# Patient Record
Sex: MALE | Race: WHITE | Employment: FULL TIME | URBAN - NONMETROPOLITAN AREA
[De-identification: names, ages, dates, MRNs, and addresses within clinical notes are randomized per-mention and may not be internally consistent; named-entity substitution may affect disease eponyms.]

---

## 2020-10-08 ENCOUNTER — APPOINTMENT (OUTPATIENT)
Dept: GENERAL RADIOLOGY | Age: 20
End: 2020-10-08
Payer: COMMERCIAL

## 2020-10-08 ENCOUNTER — HOSPITAL ENCOUNTER (EMERGENCY)
Age: 20
Discharge: HOME | End: 2020-10-08
Payer: COMMERCIAL

## 2020-10-08 VITALS
OXYGEN SATURATION: 99 % | HEART RATE: 98 BPM | RESPIRATION RATE: 18 BRPM | SYSTOLIC BLOOD PRESSURE: 132 MMHG | DIASTOLIC BLOOD PRESSURE: 77 MMHG

## 2020-10-08 PROCEDURE — 99282 EMERGENCY DEPT VISIT SF MDM: CPT

## 2020-10-08 PROCEDURE — 73562 X-RAY EXAM OF KNEE 3: CPT

## 2020-10-08 RX ORDER — NAPROXEN 500 MG/1
500 TABLET ORAL 2 TIMES DAILY
Qty: 60 TABLET | Refills: 0 | Status: SHIPPED | OUTPATIENT
Start: 2020-10-08

## 2020-10-08 ASSESSMENT — PAIN DESCRIPTION - ORIENTATION: ORIENTATION: RIGHT

## 2020-10-08 ASSESSMENT — PAIN DESCRIPTION - LOCATION: LOCATION: KNEE

## 2020-10-08 ASSESSMENT — ENCOUNTER SYMPTOMS
SHORTNESS OF BREATH: 0
SORE THROAT: 0

## 2020-10-08 ASSESSMENT — PAIN SCALES - GENERAL: PAINLEVEL_OUTOF10: 7

## 2020-10-08 NOTE — DISCHARGE INSTR - COC
Continuity of Care Form    Patient Name: Kalina Beltran   :  2000  MRN:  [id number]    Admit date:  10/8/2020  Discharge date:  ***    Code Status Order: No Order   Advance Directives:     Admitting Physician:  No admitting provider for patient encounter. PCP: No primary care provider on file. Discharging Nurse: [facility] Unit/Room#: C/C  Discharging Unit Phone Number: ***    Emergency Contact:   Extended Emergency Contact Information  Primary Emergency Contact: Krupa Camacho  Home Phone: [phone]  Mobile Phone: [phone]  Relation: Parent  Preferred language: English   needed? No    Past Surgical History:  No past surgical history on file. Immunization History: There is no immunization history on file for this patient. Active Problems: There is no problem list on file for this patient.       Isolation/Infection:   Isolation          No Isolation        Patient Infection Status     None to display          Nurse Assessment:  Last Vital Signs: /77   Pulse 98   Resp 18   SpO2 99%     Last documented pain score (0-10 scale): Pain Level: 7  Last Weight:   Wt Readings from Last 1 Encounters:   No data found for Wt     Mental Status:  {IP PT MENTAL STATUS:}    IV Access:  { RENETTA IV ACCESS:295686093}    Nursing Mobility/ADLs:  Walking   {P DME QUZH:602011531}  Transfer  {P DME NUVU:946008490}  Bathing  {CHP DME VDAS:862140121}  Dressing  {CHP DME VAXX:552233861}  Toileting  {P DME ZLVT:596041283}  Feeding  {[facility] DME RGAN:217027835}  Med Admin  {P DME PSRC:323964246}  Med Delivery   { RENETTA MED Delivery:673954612}    Wound Care Documentation and Therapy:        Elimination:  Continence:   · Bowel: {YES / RZ:93961}  · Bladder: {YES / SQ:51640}  Urinary Catheter: {Urinary Catheter:303715584}   Colostomy/Ileostomy/Ileal Conduit: {YES / ST:15606}       Date of Last BM: ***  No intake or output data in the 24 hours ending 10/08/20 1502  No intake/output data recorded.     Safety Concerns:     [address] Safety Concerns:982056168}    Impairments/Disabilities:      [address] [facility] Impairments/Disabilities:876667243}    Nutrition Therapy:  Current Nutrition Therapy:   [address] [facility] Diet List:880335186}    Routes of Feeding: {CHP DME Other Feedings:010213186}  Liquids: {Slp liquid thickness:52068}  Daily Fluid Restriction: {CHP DME Yes amt example:031235275}  Last Modified Barium Swallow with Video (Video Swallowing Test): {Done Not Done LGHC:711297821}    Treatments at the Time of Hospital Discharge:   Respiratory Treatments: ***  Oxygen Therapy:  {Therapy; copd oxygen:61161}  Ventilator:    { CC Vent JYLI:445692599}    Rehab Therapies: {THERAPEUTIC INTERVENTION:9378984920}  Weight Bearing Status/Restrictions: [address]  Weight Bearin}  Other Medical Equipment (for information only, NOT a DME order):  {EQUIPMENT:189468049}  Other Treatments: ***    Patient's personal belongings (please select all that are sent with patient):  {CHP DME Belongings:776412895}    RN SIGNATURE:  {Esignature:361007492}    CASE MANAGEMENT/SOCIAL WORK SECTION    Inpatient Status Date: ***    Readmission Risk Assessment Score:  Readmission Risk              Risk of Unplanned Readmission:        0           Discharging to Facility/ Agency   · Name:   · Address:  · Phone:  · Fax:    Dialysis Facility (if applicable)   · Name:  · Address:  · Dialysis Schedule:  · Phone:  · Fax:    / signature: {Esignature:309857503}    PHYSICIAN SECTION    Prognosis: {Prognosis:9753995696}    Condition at Discharge: [address] Patient Condition:881130262}    Rehab Potential (if transferring to Rehab): {Prognosis:7936529891}    Recommended Labs or Other Treatments After Discharge: ***    Physician Certification: I certify the above information and transfer of Shannon Hatch  is necessary for the continuing treatment of the diagnosis listed and that he requires {Admit to Appropriate Level of Care:52675} for {GREATER/LESS:653492036} 30 days.      Update Admission H&P: {CHP DME Changes in XKRJR:484026285}    PHYSICIAN SIGNATURE:  {Esignature:036332062}

## 2020-10-08 NOTE — ED PROVIDER NOTES
[facility]  eMERGENCY dEPARTMENT eNCOUnter          CHIEF COMPLAINT       Chief Complaint   Patient presents with    Knee Injury       Nurses Notes reviewed and I agree except as noted inthe HPI. HISTORY OF PRESENT ILLNESS    Sohan Acevedo is a 21 y.o. male who presents to the Emergency Department for the evaluation of right knee pain. 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 He initially had severe pain which she states has improved quite a bit time has passed. He states it is now a 3/10 at rest but does increase with movement and bending to become a pulsating 9/10. He denies any associated numbness, weakness or sensation of instability but states pain worsens when he puts pressure on the knee and he has been unable to tolerate full weightbearing. Pain worsened as well with pressing the gas pedal to drive. He has not taken anything for pain. Denies any anticoagulant use. Declines filing this as Workmen's Comp. injury. The HPI was provided by the patient. REVIEW OF SYSTEMS     Review of Systems   Constitutional: Negative for chills and fever. HENT: Negative for sore throat. Respiratory: Negative for shortness of breath. Musculoskeletal: Positive for arthralgias. Skin: Positive for wound. Neurological: Negative for weakness and numbness. PAST MEDICAL HISTORY    has no past medical history on file. SURGICAL HISTORY      has no past surgical history on file. CURRENT MEDICATIONS       Discharge Medication List as of 10/8/2020  2:14 PM          ALLERGIES     has No Known Allergies. FAMILY HISTORY     has no family status information on file. family history is not on file. SOCIAL HISTORY          PHYSICAL EXAM     INITIAL VITALS:  blood pressure is 132/77 and his pulse is 98. His respiration is 18 and oxygen saturation is 99%. Physical Exam  Vitals signs and nursing note reviewed. Constitutional:       Appearance: Normal appearance. HENT:      Head: Normocephalic and atraumatic. Eyes:      Conjunctiva/sclera: Conjunctivae normal.   Cardiovascular:      Rate and Rhythm: Normal rate. Pulmonary:      Effort: Pulmonary effort is normal. No respiratory distress. Musculoskeletal:      Right knee: He exhibits swelling. He exhibits normal range of motion (Patient able to hold the right lower extremity in full extension against gravity and actively flex), no deformity, no erythema, no LCL laxity and no MCL laxity. Tenderness (Patellar) found. No medial joint line, no lateral joint line, no MCL and no LCL tenderness noted. Right upper leg: Normal.      Right lower leg: Normal.        Legs:    Skin:     General: Skin is warm and dry. Neurological:      General: No focal deficit present. Mental Status: He is alert. DIFFERENTIAL DIAGNOSIS:   Differential diagnoses are discussed    DIAGNOSTIC RESULTS     EKG: All EKG's are interpreted by the Emergency Department Physician who either signs or Co-signsthis chart in the absence of a cardiologist.          RADIOLOGY: non-plain film images(s) such as CT, Ultrasound and MRI are read by the radiologist.    XR KNEE RIGHT (3 VIEWS)   Final Result    No evidence of acute osseous injury of the right knee. **This report has been created using voice recognition software. It may contain minor errors which are inherent in voice recognition technology. **      Final report electronically signed by Dr. Aramis Betancourt MD on 10/8/2020 1:58 PM          LABS:    Labs Reviewed - No data to display    EMERGENCY DEPARTMENT COURSE:   Vitals:    Vitals:    10/08/20 1327   BP: 132/77   Pulse: 98   Resp: 18   SpO2: 99%      2:49 PM EDT: The patient was seen and evaluated. Presents for complaints of right knee injury that occurred earlier today. He denies any associated neurologic changes.   No treatment prior to arrival.  He has reassuring vital signs. X-ray did not show any acute bony abnormality. Discussed with the patient the inability to rule out any ligamentous injury though I suspect this is probably more contusion/hematoma causing his pain. However, due to his difficulty tolerating weightbearing, we will provide crutches and Ace wrap. RICE discussed and work note was provided. Recommend follow-up with the orthopedic service should his symptoms persist or fail to improve with the above recommendations. He was given a prescription for Naprosyn. He was agreeable with the above plan and denied further needs upon discharge. CRITICAL CARE:   None    CONSULTS:  None    PROCEDURES:  None    FINAL IMPRESSION      1.  Contusion of right knee, initial encounter          DISPOSITION/PLAN   Discharge    PATIENT REFERRED TO:  Hussain Macdonald [address]  [place] [address] [address] [facility] [phone]    As needed    [address] [facility] [address] EMERGENCY DEPT  [address]  [phone]    If symptoms worsen      DISCHARGEMEDICATIONS:  Discharge Medication List as of 10/8/2020  2:14 PM      START taking these medications    Details   naproxen (NAPROSYN) 500 MG tablet Take 1 tablet by mouth 2 times daily Do not take with ibuprofen, advil, motrin, or aleve., Disp-60 tablet,R-0Print             (Please note that portions of this note were completedwith a voice recognition program.  Efforts were made to edit the dictations but occasionally words are mis-transcribed.)       Raymond Penn PA-C  10/08/20 9189

## 2021-01-06 ENCOUNTER — APPOINTMENT (OUTPATIENT)
Dept: CT IMAGING | Age: 21
End: 2021-01-06
Payer: COMMERCIAL

## 2021-01-06 ENCOUNTER — HOSPITAL ENCOUNTER (EMERGENCY)
Age: 21
Discharge: HOME | End: 2021-01-06
Attending: EMERGENCY MEDICINE
Payer: COMMERCIAL

## 2021-01-06 VITALS
HEART RATE: 97 BPM | RESPIRATION RATE: 18 BRPM | TEMPERATURE: 98.7 F | DIASTOLIC BLOOD PRESSURE: 77 MMHG | OXYGEN SATURATION: 99 % | SYSTOLIC BLOOD PRESSURE: 137 MMHG

## 2021-01-06 DIAGNOSIS — S09.90XA INJURY OF HEAD, INITIAL ENCOUNTER: ICD-10-CM

## 2021-01-06 DIAGNOSIS — R55 BLACKOUT: Primary | ICD-10-CM

## 2021-01-06 LAB
AMPHETAMINE+METHAMPHETAMINE URINE SCREEN: NEGATIVE
BARBITURATE QUANTITATIVE URINE: NEGATIVE
BENZODIAZEPINE QUANTITATIVE URINE: NEGATIVE
CANNABINOID QUANTITATIVE URINE: POSITIVE
COCAINE METABOLITE QUANTITATIVE URINE: NEGATIVE
OPIATES, URINE: NEGATIVE
OXYCODONE: NEGATIVE
PHENCYCLIDINE QUANTITATIVE URINE: NEGATIVE

## 2021-01-06 PROCEDURE — 80307 DRUG TEST PRSMV CHEM ANLYZR: CPT

## 2021-01-06 PROCEDURE — 99282 EMERGENCY DEPT VISIT SF MDM: CPT

## 2021-01-06 PROCEDURE — 72128 CT CHEST SPINE W/O DYE: CPT

## 2021-01-06 PROCEDURE — 70450 CT HEAD/BRAIN W/O DYE: CPT

## 2021-01-06 ASSESSMENT — ENCOUNTER SYMPTOMS
EYE REDNESS: 0
RHINORRHEA: 0
COLOR CHANGE: 0
SINUS PRESSURE: 0
CHEST TIGHTNESS: 0
VOMITING: 1
ABDOMINAL PAIN: 0
DIARRHEA: 0
EYE ITCHING: 0
CONSTIPATION: 0
ABDOMINAL DISTENTION: 0
NAUSEA: 1
EYE PAIN: 0
EYE DISCHARGE: 0
SINUS PAIN: 0
SHORTNESS OF BREATH: 0
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
